# Patient Record
Sex: FEMALE | Race: WHITE | NOT HISPANIC OR LATINO | Employment: UNEMPLOYED | URBAN - METROPOLITAN AREA
[De-identification: names, ages, dates, MRNs, and addresses within clinical notes are randomized per-mention and may not be internally consistent; named-entity substitution may affect disease eponyms.]

---

## 2017-02-21 ENCOUNTER — OFFICE VISIT (OUTPATIENT)
Dept: URGENT CARE | Facility: CLINIC | Age: 27
End: 2017-02-21
Payer: COMMERCIAL

## 2017-02-21 PROCEDURE — 99284 EMERGENCY DEPT VISIT MOD MDM: CPT

## 2017-02-21 PROCEDURE — G0383 LEV 4 HOSP TYPE B ED VISIT: HCPCS

## 2018-01-15 ENCOUNTER — OFFICE VISIT (OUTPATIENT)
Dept: URGENT CARE | Facility: CLINIC | Age: 28
End: 2018-01-15
Payer: COMMERCIAL

## 2018-01-15 PROCEDURE — 99283 EMERGENCY DEPT VISIT LOW MDM: CPT

## 2018-01-15 PROCEDURE — G0382 LEV 3 HOSP TYPE B ED VISIT: HCPCS

## 2018-01-23 VITALS
OXYGEN SATURATION: 98 % | DIASTOLIC BLOOD PRESSURE: 60 MMHG | TEMPERATURE: 98.8 F | SYSTOLIC BLOOD PRESSURE: 92 MMHG | RESPIRATION RATE: 18 BRPM | HEART RATE: 116 BPM

## 2018-04-30 LAB
ALBUMIN SERPL BCP-MCNC: 4.5 G/DL (ref 3.5–5.7)
ALP SERPL-CCNC: 68 IU/L (ref 40–150)
ALT SERPL W P-5'-P-CCNC: 12 IU/L (ref 0–50)
AMYLASE (HISTORICAL): 36 U/L (ref 29–103)
ANION GAP SERPL CALCULATED.3IONS-SCNC: 10.1 MM/L
APTT PPP: 35.9 SEC (ref 24.4–37.6)
AST SERPL W P-5'-P-CCNC: 12 U/L (ref 7–26)
BASOPHILS # BLD AUTO: 0 X3/UL (ref 0–0.3)
BASOPHILS # BLD AUTO: 0.5 % (ref 0–2)
BILIRUB SERPL-MCNC: 0.6 MG/DL (ref 0.3–1)
BILIRUB UR QL STRIP: NEGATIVE
BUN SERPL-MCNC: 12 MG/DL (ref 7–25)
CALCIUM SERPL-MCNC: 9.7 MG/DL (ref 8.6–10.5)
CHLORIDE SERPL-SCNC: 103 MM/L (ref 98–107)
CLARITY UR: CLEAR
CO2 SERPL-SCNC: 27 MM/L (ref 21–31)
COLOR UR: YELLOW
CREAT SERPL-MCNC: 0.85 MG/DL (ref 0.6–1.2)
DEPRECATED RDW RBC AUTO: 12.4 %
EGFR (HISTORICAL): > 60 GFR
EGFR AFRICAN AMERICAN (HISTORICAL): > 60 GFR
EOSINOPHIL # BLD AUTO: 0.1 X3/UL (ref 0–0.5)
EOSINOPHIL NFR BLD AUTO: 2.1 % (ref 0–5)
GLUCOSE (HISTORICAL): 90 MG/DL (ref 65–99)
GLUCOSE UR STRIP-MCNC: NEGATIVE MG/DL
HCG, QUALITATIVE (HISTORICAL): NEGATIVE
HCT VFR BLD AUTO: 43.7 % (ref 37–47)
HGB BLD-MCNC: 15.2 G/DL (ref 12–16)
HGB UR QL STRIP.AUTO: NEGATIVE
INR PPP: 1.04 (ref 0.9–1.5)
KETONES UR STRIP-MCNC: NEGATIVE MG/DL
LEUKOCYTE ESTERASE UR QL STRIP: NEGATIVE
LIPASE SERPL-CCNC: 8 U/L (ref 11–82)
LYMPHOCYTES # BLD AUTO: 1.4 X3/UL (ref 1.2–4.2)
LYMPHOCYTES NFR BLD AUTO: 19.3 % (ref 20.5–51.1)
MCH RBC QN AUTO: 31.9 PG (ref 26–34)
MCHC RBC AUTO-ENTMCNC: 34.7 G/DL (ref 31–37)
MCV RBC AUTO: 91.9 FL (ref 81–99)
MONOCYTES # BLD AUTO: 0.4 X3/UL (ref 0–1)
MONOCYTES NFR BLD AUTO: 6.1 % (ref 1.7–12)
NEUTROPHILS # BLD AUTO: 5.1 X3/UL (ref 1.4–6.5)
NEUTS SEG NFR BLD AUTO: 72 % (ref 42.2–75.2)
NITRITE UR QL STRIP: NEGATIVE
OSMOLALITY, SERUM (HISTORICAL): 271 MOSM (ref 262–291)
PH UR STRIP.AUTO: 6.5 [PH] (ref 4.5–8)
PLATELET # BLD AUTO: 270 X3/UL (ref 130–400)
PMV BLD AUTO: 8.2 FL
POTASSIUM SERPL-SCNC: 4.1 MM/L (ref 3.5–5.5)
PROT UR STRIP-MCNC: NEGATIVE MG/DL
PROTHROMBIN TIME (HISTORICAL): 12 SEC (ref 10.1–12.9)
RBC # BLD AUTO: 4.75 X6/UL (ref 3.9–5.2)
SODIUM SERPL-SCNC: 136 MM/L (ref 134–143)
SP GR UR STRIP.AUTO: 1.01 (ref 1–1.03)
TOTAL PROTEIN (HISTORICAL): 7.4 G/DL (ref 6.4–8.9)
UROBILINOGEN UR QL STRIP.AUTO: 0.2 EU/DL (ref 0.2–8)
WBC # BLD AUTO: 7 X3/UL (ref 4.8–10.8)

## 2018-07-30 PROBLEM — F40.10 SOCIAL ANXIETY DISORDER: Status: ACTIVE | Noted: 2018-07-30

## 2018-07-30 PROBLEM — F43.10 PTSD (POST-TRAUMATIC STRESS DISORDER): Status: ACTIVE | Noted: 2018-07-30

## 2018-07-30 PROBLEM — G40.909 EPILEPSY (HCC): Status: ACTIVE | Noted: 2018-07-30

## 2018-07-30 RX ORDER — ALPRAZOLAM 0.25 MG/1
TABLET ORAL 2 TIMES DAILY PRN
COMMUNITY

## 2018-07-30 RX ORDER — LAMOTRIGINE 100 MG/1
100 TABLET ORAL 2 TIMES DAILY
COMMUNITY

## 2018-07-30 RX ORDER — FOLIC ACID 1 MG/1
1 TABLET ORAL DAILY
COMMUNITY
End: 2018-09-12

## 2018-09-12 ENCOUNTER — OFFICE VISIT (OUTPATIENT)
Dept: URGENT CARE | Facility: CLINIC | Age: 28
End: 2018-09-12
Payer: COMMERCIAL

## 2018-09-12 VITALS
WEIGHT: 216.2 LBS | BODY MASS INDEX: 32.77 KG/M2 | HEART RATE: 86 BPM | RESPIRATION RATE: 18 BRPM | HEIGHT: 68 IN | OXYGEN SATURATION: 97 % | TEMPERATURE: 97.9 F | SYSTOLIC BLOOD PRESSURE: 118 MMHG | DIASTOLIC BLOOD PRESSURE: 78 MMHG

## 2018-09-12 DIAGNOSIS — J02.9 SORE THROAT: Primary | ICD-10-CM

## 2018-09-12 LAB — S PYO AG THROAT QL: NEGATIVE

## 2018-09-12 PROCEDURE — 99283 EMERGENCY DEPT VISIT LOW MDM: CPT | Performed by: PHYSICIAN ASSISTANT

## 2018-09-12 PROCEDURE — 87070 CULTURE OTHR SPECIMN AEROBIC: CPT | Performed by: PHYSICIAN ASSISTANT

## 2018-09-12 PROCEDURE — 87147 CULTURE TYPE IMMUNOLOGIC: CPT | Performed by: PHYSICIAN ASSISTANT

## 2018-09-12 PROCEDURE — G0382 LEV 3 HOSP TYPE B ED VISIT: HCPCS | Performed by: PHYSICIAN ASSISTANT

## 2018-09-12 PROCEDURE — 87430 STREP A AG IA: CPT | Performed by: PHYSICIAN ASSISTANT

## 2018-09-12 NOTE — PATIENT INSTRUCTIONS
Negative rapid strep here in the office  She does have cough and congestion so likely viral infection  We will check a culture  Follow up with PCP in 3-5 days  Proceed to  ER if symptoms worsen

## 2018-09-12 NOTE — PROGRESS NOTES
[facility]        NAME: Denice Connell is a 33 y o  female  : 1990    MRN: [id number]  DATE: 2018  TIME: 10:42 AM    Assessment and Plan   Sore throat [J02 9]  1  Sore throat  Throat culture    POCT rapid strepA         Patient Instructions       Negative rapid strep here in the office  She does have cough and congestion so likely viral infection  We will check a culture  Follow up with PCP in 3-5 days  Proceed to  ER if symptoms worsen  Chief Complaint     Chief Complaint   Patient presents with    Fever    Sore Throat     started Sun  dtr being treated for strep  History of Present Illness         18-year-old female complains of sore throat for 1 day  Her daughter was diagnosed with strep  Her other daughter is here sick also  No fever at home  She has some chills and dizziness  No chest pain or shortness of breath  She has cough and congestion  No medicines over-the-counter for this  No trouble swallowing  No nausea or vomiting          Review of Systems   Review of Systems      Current Medications       Current Outpatient Prescriptions:     ALPRAZolam (XANAX) 0 25 mg tablet, Take by mouth 2 (two) times a day as needed for anxiety, Disp: , Rfl:     lamoTRIgine (LaMICtal) 100 mg tablet, Take 100 mg by mouth 2 (two) times a day, Disp: , Rfl:     Current Allergies     Allergies as of 2018 - Reviewed 2018   Allergen Reaction Noted    Latex Rash 2018            The following portions of the patient's history were reviewed and updated as appropriate: allergies, current medications, past family history, past medical history, past social history, past surgical history and problem list      Past Medical History:   Diagnosis Date    Generalized convulsive epilepsy ([facility] [address] )     PTSD (post-traumatic stress disorder)     Social anxiety disorder        Past Surgical History:   Procedure Laterality Date    DILATION AND CURETTAGE OF UTERUS   Family History   Problem Relation Age of Onset    Hypertension Mother     No Known Problems Father          Medications have been verified  Objective   /78 (BP Location: Left arm, Patient Position: Sitting)   Pulse 86   Temp 97 9 °F (36 6 °C) (Temporal)   Resp 18   Ht 5' 8" (1 727 m)   Wt 98 1 kg (216 lb 3 2 oz)   LMP 08/16/2018   SpO2 97%   BMI 32 87 kg/m²        Physical Exam     Physical Exam   Constitutional: She appears well-developed and well-nourished  No distress  HENT:   Right Ear: Tympanic membrane, external ear and ear canal normal    Left Ear: Tympanic membrane, external ear and ear canal normal    Nose: Nose normal  Right sinus exhibits no maxillary sinus tenderness and no frontal sinus tenderness  Left sinus exhibits no maxillary sinus tenderness and no frontal sinus tenderness  Mouth/Throat: Posterior oropharyngeal erythema present  No oropharyngeal exudate, posterior oropharyngeal edema or tonsillar abscesses  Mild erythema of the posterior oropharynx without exudate or tonsillar swelling  Eyes: Conjunctivae and EOM are normal  Pupils are equal, round, and reactive to light  No scleral icterus  Neck: Normal range of motion  Neck supple  Cardiovascular: Normal rate, regular rhythm and normal heart sounds  Pulmonary/Chest: Effort normal and breath sounds normal  No respiratory distress  She has no wheezes  She has no rales  Abdominal: Soft  Bowel sounds are normal  She exhibits no distension and no mass  There is no tenderness  There is no rebound and no guarding  Lymphadenopathy:     She has no cervical adenopathy  Skin: Skin is warm and dry  No rash noted

## 2018-09-15 LAB — BACTERIA THROAT CULT: ABNORMAL

## 2018-09-17 ENCOUNTER — TELEPHONE (OUTPATIENT)
Dept: URGENT CARE | Facility: CLINIC | Age: 28
End: 2018-09-17

## 2018-09-17 DIAGNOSIS — J02.9 SORE THROAT: Primary | ICD-10-CM

## 2018-09-17 RX ORDER — AMOXICILLIN 500 MG/1
500 TABLET, FILM COATED ORAL 3 TIMES DAILY
Qty: 21 TABLET | Refills: 0 | Status: SHIPPED | OUTPATIENT
Start: 2018-09-17 | End: 2018-09-27

## 2018-09-17 RX ORDER — BROMPHENIRAMINE MALEATE, PSEUDOEPHEDRINE HYDROCHLORIDE, AND DEXTROMETHORPHAN HYDROBROMIDE 2; 30; 10 MG/5ML; MG/5ML; MG/5ML
5 SYRUP ORAL 4 TIMES DAILY PRN
Qty: 118 ML | Refills: 0 | Status: SHIPPED | OUTPATIENT
Start: 2018-09-17 | End: 2018-12-17 | Stop reason: ALTCHOICE

## 2018-09-17 NOTE — TELEPHONE ENCOUNTER
I spoke with the patient regarding her throat culture  She is positive for strep C  She says that she has a sore throat and cough and congestion  She has not taken any over-the-counter decongestants or medicines for the sore throat  I discussed with her that this is likely viral but she does have a positive throat culture we can treat with amoxicillin to cover the throat culture  We also use Bromfed for the cough and congestion

## 2018-12-17 ENCOUNTER — OFFICE VISIT (OUTPATIENT)
Dept: URGENT CARE | Facility: CLINIC | Age: 28
End: 2018-12-17
Payer: COMMERCIAL

## 2018-12-17 VITALS
TEMPERATURE: 98.6 F | SYSTOLIC BLOOD PRESSURE: 128 MMHG | OXYGEN SATURATION: 99 % | HEIGHT: 68 IN | BODY MASS INDEX: 32.28 KG/M2 | WEIGHT: 213 LBS | RESPIRATION RATE: 20 BRPM | HEART RATE: 88 BPM | DIASTOLIC BLOOD PRESSURE: 88 MMHG

## 2018-12-17 DIAGNOSIS — H65.02 ACUTE SEROUS OTITIS MEDIA OF LEFT EAR, RECURRENCE NOT SPECIFIED: Primary | ICD-10-CM

## 2018-12-17 PROCEDURE — 99283 EMERGENCY DEPT VISIT LOW MDM: CPT | Performed by: PHYSICIAN ASSISTANT

## 2018-12-17 PROCEDURE — G0382 LEV 3 HOSP TYPE B ED VISIT: HCPCS | Performed by: PHYSICIAN ASSISTANT

## 2018-12-17 RX ORDER — AMOXICILLIN 500 MG/1
500 TABLET, FILM COATED ORAL 3 TIMES DAILY
Qty: 21 TABLET | Refills: 0 | Status: SHIPPED | OUTPATIENT
Start: 2018-12-17 | End: 2018-12-24

## 2018-12-17 RX ORDER — DEXTROMETHORPHAN HYDROBROMIDE AND PROMETHAZINE HYDROCHLORIDE 15; 6.25 MG/5ML; MG/5ML
5 SYRUP ORAL 4 TIMES DAILY PRN
Qty: 118 ML | Refills: 0 | Status: SHIPPED | OUTPATIENT
Start: 2018-12-17 | End: 2019-01-30 | Stop reason: ALTCHOICE

## 2018-12-17 NOTE — PROGRESS NOTES
[facility]        NAME: Yun Alfred is a 33 y o  female  : 1990    MRN: [id number]  DATE: 2018  TIME: 9:58 AM    Assessment and Plan   Acute serous otitis media of left ear, recurrence not specified [H65 02]  1  Acute serous otitis media of left ear, recurrence not specified  amoxicillin (AMOXIL) 500 MG tablet    promethazine-dextromethorphan (PHENERGAN-DM) 6 25-15 mg/5 mL oral syrup         Patient Instructions       Follow up with PCP in 3-5 days  Proceed to  ER if symptoms worsen  Chief Complaint     Chief Complaint   Patient presents with    Cold Like Symptoms     x 2-3 weeks, c/o chest congestion    Cough    Sore Throat     mild sore throat from coughing         History of Present Illness         33year-old female complains of cough and congestion for 1 week now with left ear pain  She has taken over-the-counter Robitussin with no relief  No fever chills  She says her left ears popping when she blows her nose  Cough is mostly dry  Cough keeps her up at night  No chest pain or shortness of breath          Review of Systems   Review of Systems      Current Medications       Current Outpatient Prescriptions:     ALPRAZolam (XANAX) 0 25 mg tablet, Take by mouth 2 (two) times a day as needed for anxiety, Disp: , Rfl:     lamoTRIgine (LaMICtal) 100 mg tablet, Take 100 mg by mouth 2 (two) times a day, Disp: , Rfl:     amoxicillin (AMOXIL) 500 MG tablet, Take 1 tablet (500 mg total) by mouth 3 (three) times a day for 7 days, Disp: 21 tablet, Rfl: 0    promethazine-dextromethorphan (PHENERGAN-DM) 6 25-15 mg/5 mL oral syrup, Take 5 mL by mouth 4 (four) times a day as needed for cough, Disp: 118 mL, Rfl: 0    Current Allergies     Allergies as of 2018 - Reviewed 2018   Allergen Reaction Noted    Latex Rash 2018            The following portions of the patient's history were reviewed and updated as appropriate: allergies, current medications, past family history, past medical history, past social history, past surgical history and problem list      Past Medical History:   Diagnosis Date    Generalized convulsive epilepsy ([address] )     PTSD (post-traumatic stress disorder)     Social anxiety disorder        Past Surgical History:   Procedure Laterality Date    DILATION AND CURETTAGE OF UTERUS  2012       Family History   Problem Relation Age of Onset    Hypertension Mother     No Known Problems Father          Medications have been verified  Objective   /88   Pulse 88   Temp 98 6 °F (37 °C) (Tympanic)   Resp 20   Ht 5' 8" (1 727 m)   Wt 96 6 kg (213 lb)   SpO2 99%   BMI 32 39 kg/m²        Physical Exam     Physical Exam   Constitutional: She appears well-developed and well-nourished  No distress  HENT:   Right Ear: Tympanic membrane, external ear and ear canal normal    Left Ear: External ear and ear canal normal  No foreign bodies ( )  Tympanic membrane is erythematous and bulging  Left ear middle ear effusion:  serous  Nose: Nose normal  Right sinus exhibits no maxillary sinus tenderness and no frontal sinus tenderness  Left sinus exhibits no maxillary sinus tenderness and no frontal sinus tenderness  Mouth/Throat: Oropharynx is clear and moist  No posterior oropharyngeal erythema  Eyes: Pupils are equal, round, and reactive to light  Conjunctivae and EOM are normal  No scleral icterus  Neck: Normal range of motion  Neck supple  Cardiovascular: Normal rate, regular rhythm and normal heart sounds  Pulmonary/Chest: Effort normal and breath sounds normal  No respiratory distress  She has no wheezes  She has no rales  Abdominal: Soft  Bowel sounds are normal  She exhibits no distension and no mass  There is no tenderness  There is no rebound and no guarding  Lymphadenopathy:     She has no cervical adenopathy  Skin: Skin is warm and dry  No rash noted

## 2019-01-30 ENCOUNTER — OFFICE VISIT (OUTPATIENT)
Dept: URGENT CARE | Facility: CLINIC | Age: 29
End: 2019-01-30
Payer: COMMERCIAL

## 2019-01-30 VITALS
SYSTOLIC BLOOD PRESSURE: 128 MMHG | HEIGHT: 69 IN | HEART RATE: 110 BPM | RESPIRATION RATE: 18 BRPM | WEIGHT: 200 LBS | TEMPERATURE: 98.9 F | OXYGEN SATURATION: 100 % | BODY MASS INDEX: 29.62 KG/M2 | DIASTOLIC BLOOD PRESSURE: 82 MMHG

## 2019-01-30 DIAGNOSIS — R42 VERTIGO: Primary | ICD-10-CM

## 2019-01-30 DIAGNOSIS — M62.838 NECK MUSCLE SPASM: ICD-10-CM

## 2019-01-30 PROCEDURE — 99213 OFFICE O/P EST LOW 20 MIN: CPT | Performed by: PHYSICIAN ASSISTANT

## 2019-01-30 PROCEDURE — 99283 EMERGENCY DEPT VISIT LOW MDM: CPT | Performed by: PHYSICIAN ASSISTANT

## 2019-01-30 PROCEDURE — G0382 LEV 3 HOSP TYPE B ED VISIT: HCPCS | Performed by: PHYSICIAN ASSISTANT

## 2019-01-30 RX ORDER — METHOCARBAMOL 500 MG/1
500 TABLET, FILM COATED ORAL 3 TIMES DAILY
Qty: 40 TABLET | Refills: 0 | Status: SHIPPED | OUTPATIENT
Start: 2019-01-30

## 2019-01-30 NOTE — PATIENT INSTRUCTIONS
Appears to be muscle spasm in the neck causing the dizziness and ear pain  She has some TM joint pop  She will benefit from anti-inflammatories, muscle relaxers for the neck spasm  May also benefit from physical therapy  She can try home besides with a tennis ball on that spot in her trapezius muscle

## 2023-05-01 NOTE — PROGRESS NOTES
[facility]        NAME: Hector Goodpasture is a 33 y o  female  : 1990    MRN: [id number]  DATE: 2019  TIME: 12:49 PM    Assessment and Plan   Vertigo [R42]  1  Vertigo     2  Neck muscle spasm  methocarbamol (ROBAXIN) 500 mg tablet         Patient Instructions     Patient Instructions     Appears to be muscle spasm in the neck causing the dizziness and ear pain  She has some TM joint pop  She will benefit from anti-inflammatories, muscle relaxers for the neck spasm  May also benefit from physical therapy  She can try home besides with a tennis ball on that spot in her trapezius muscle  Follow up with PCP in 3-5 days  Proceed to  ER if symptoms worsen  Chief Complaint     Chief Complaint   Patient presents with   Sabine Hyatt     started in L ear first x a few weeks; now in R ear x a few days  has also been dizzy for a few days  History of Present Illness         78-year-old female presents with 1 week of left than right ear pain, dizziness and some neck pain  She says she feels dizzy when she bends over  No nausea or vomiting  No change in hearing  No cough or runny nose  No ringing in her ears          Review of Systems   Review of Systems      Current Medications       Current Outpatient Prescriptions:     ALPRAZolam (XANAX) 0 25 mg tablet, Take by mouth 2 (two) times a day as needed for anxiety, Disp: , Rfl:     lamoTRIgine (LaMICtal) 100 mg tablet, Take 100 mg by mouth 2 (two) times a day, Disp: , Rfl:     methocarbamol (ROBAXIN) 500 mg tablet, Take 1 tablet (500 mg total) by mouth 3 (three) times a day, Disp: 40 tablet, Rfl: 0    Current Allergies     Allergies as of 2019 - Reviewed 2019   Allergen Reaction Noted    Latex Rash 2018            The following portions of the patient's history were reviewed and updated as appropriate: allergies, current medications, past family history, past medical history, past social history, past surgical history and problem list      Past Medical History:   Diagnosis Date    Generalized convulsive epilepsy ([address] )     PTSD (post-traumatic stress disorder)     Social anxiety disorder        Past Surgical History:   Procedure Laterality Date    DILATION AND CURETTAGE OF UTERUS  2012       Family History   Problem Relation Age of Onset    Hypertension Mother     No Known Problems Father          Medications have been verified  Objective   /82 (BP Location: Left arm, Patient Position: Sitting)   Pulse (!) 110   Temp 98 9 °F (37 2 °C) (Temporal)   Resp 18   Ht 5' 9" (1 753 m)   Wt 90 7 kg (200 lb)   SpO2 100%   BMI 29 53 kg/m²        Physical Exam     Physical Exam   Constitutional: She is oriented to person, place, and time  She appears well-developed and well-nourished  No distress  HENT:   Right Ear: Tympanic membrane, external ear and ear canal normal    Left Ear: Tympanic membrane, external ear and ear canal normal    Nose: Nose normal  Right sinus exhibits no maxillary sinus tenderness and no frontal sinus tenderness  Left sinus exhibits no maxillary sinus tenderness and no frontal sinus tenderness  Mouth/Throat: Oropharynx is clear and moist  No posterior oropharyngeal erythema  Eyes: Pupils are equal, round, and reactive to light  Conjunctivae and EOM are normal  No scleral icterus  Neck: Normal range of motion  Neck supple  Cardiovascular: Normal rate, regular rhythm and normal heart sounds  Pulmonary/Chest: Effort normal and breath sounds normal  No respiratory distress  She has no wheezes  She has no rales  Abdominal: Soft  Bowel sounds are normal  She exhibits no distension and no mass  There is no tenderness  There is no rebound and no guarding  Musculoskeletal: Normal range of motion  Tender spasm over R trapezius      Bilateral mild TM joint tenderness and fullness  She does have a pop on the left side when she ranges her jaw     Lymphadenopathy:     She has no cervical adenopathy  Neurological: She is alert and oriented to person, place, and time  No cranial nerve deficit  Negative [place] bilaterally   Skin: Skin is warm and dry  No rash noted  The patient is a 55y Female complaining of seizures.
